# Patient Record
Sex: MALE
[De-identification: names, ages, dates, MRNs, and addresses within clinical notes are randomized per-mention and may not be internally consistent; named-entity substitution may affect disease eponyms.]

---

## 2017-05-07 ENCOUNTER — HOSPITAL ENCOUNTER (EMERGENCY)
Dept: HOSPITAL 57 - BURERS | Age: 72
Discharge: HOME | End: 2017-05-07
Payer: MEDICARE

## 2017-05-07 DIAGNOSIS — E11.9: ICD-10-CM

## 2017-05-07 DIAGNOSIS — M54.2: Primary | ICD-10-CM

## 2017-05-07 DIAGNOSIS — Z79.84: ICD-10-CM

## 2017-05-07 DIAGNOSIS — E78.5: ICD-10-CM

## 2017-05-07 DIAGNOSIS — Z87.891: ICD-10-CM

## 2017-05-07 DIAGNOSIS — Z79.899: ICD-10-CM

## 2017-05-07 DIAGNOSIS — I10: ICD-10-CM

## 2017-05-07 LAB
ALBUMIN SERPL BCG-MCNC: 4.7 G/DL (ref 3.4–4.8)
ALP SERPL-CCNC: 53 U/L (ref 40–150)
ALT SERPL W P-5'-P-CCNC: 13 U/L (ref 8–55)
ANION GAP SERPL CALC-SCNC: 17 MMOL/L (ref 10–20)
AST SERPL-CCNC: 25 U/L (ref 5–34)
BASOPHILS # BLD AUTO: 0.1 THOU/UL (ref 0–0.2)
BASOPHILS NFR BLD AUTO: 1.2 % (ref 0–1)
BILIRUB SERPL-MCNC: 0.4 MG/DL (ref 0.2–1.2)
BUN SERPL-MCNC: 23 MG/DL (ref 8.4–25.7)
CALCIUM SERPL-MCNC: 10.1 MG/DL (ref 7.8–10.44)
CHLORIDE SERPL-SCNC: 100 MMOL/L (ref 98–107)
CO2 SERPL-SCNC: 23 MMOL/L (ref 23–31)
CREAT CL PREDICTED SERPL C-G-VRATE: 0 ML/MIN (ref 70–130)
EOSINOPHIL # BLD AUTO: 0.1 THOU/UL (ref 0–0.7)
EOSINOPHIL NFR BLD AUTO: 1.5 % (ref 0–10)
GLOBULIN SER CALC-MCNC: 3.4 G/DL (ref 2.4–3.5)
GLUCOSE SERPL-MCNC: 97 MG/DL (ref 83–110)
HGB BLD-MCNC: 12.4 G/DL (ref 14–18)
LYMPHOCYTES # BLD AUTO: 1.6 THOU/UL (ref 1.2–3.4)
LYMPHOCYTES NFR BLD AUTO: 21.5 % (ref 21–51)
MCH RBC QN AUTO: 32.5 PG (ref 27–31)
MCV RBC AUTO: 92.6 FL (ref 80–94)
MONOCYTES # BLD AUTO: 0.6 THOU/UL (ref 0.11–0.59)
MONOCYTES NFR BLD AUTO: 8.2 % (ref 0–10)
NEUTROPHILS # BLD AUTO: 4.9 THOU/UL (ref 1.4–6.5)
NEUTROPHILS NFR BLD AUTO: 67.6 % (ref 42–75)
PLATELET # BLD AUTO: 345 THOU/UL (ref 130–400)
POTASSIUM SERPL-SCNC: 4 MMOL/L (ref 3.5–5.1)
RBC # BLD AUTO: 3.8 MILL/UL (ref 4.7–6.1)
SODIUM SERPL-SCNC: 136 MMOL/L (ref 136–145)
WBC # BLD AUTO: 7.3 THOU/UL (ref 4.8–10.8)

## 2017-05-07 PROCEDURE — 85025 COMPLETE CBC W/AUTO DIFF WBC: CPT

## 2017-05-07 PROCEDURE — 80053 COMPREHEN METABOLIC PANEL: CPT

## 2017-05-07 PROCEDURE — 36416 COLLJ CAPILLARY BLOOD SPEC: CPT

## 2017-05-07 PROCEDURE — 84443 ASSAY THYROID STIM HORMONE: CPT

## 2018-06-15 ENCOUNTER — HOSPITAL ENCOUNTER (EMERGENCY)
Dept: HOSPITAL 57 - BURERS | Age: 73
Discharge: HOME | End: 2018-06-15
Payer: MEDICARE

## 2018-06-15 DIAGNOSIS — Z79.899: ICD-10-CM

## 2018-06-15 DIAGNOSIS — Z79.84: ICD-10-CM

## 2018-06-15 DIAGNOSIS — C78.00: Primary | ICD-10-CM

## 2018-06-15 DIAGNOSIS — F41.9: ICD-10-CM

## 2018-06-15 DIAGNOSIS — E78.5: ICD-10-CM

## 2018-06-15 DIAGNOSIS — I10: ICD-10-CM

## 2018-06-15 LAB
ALBUMIN SERPL BCG-MCNC: 3.5 G/DL (ref 3.4–4.8)
ALP SERPL-CCNC: 76 U/L (ref 40–150)
ALT SERPL W P-5'-P-CCNC: 8 U/L (ref 8–55)
ANION GAP SERPL CALC-SCNC: 15 MMOL/L (ref 10–20)
AST SERPL-CCNC: 13 U/L (ref 5–34)
BILIRUB SERPL-MCNC: 0.5 MG/DL (ref 0.2–1.2)
BUN SERPL-MCNC: 11 MG/DL (ref 8.4–25.7)
CALCIUM SERPL-MCNC: 8.8 MG/DL (ref 7.8–10.44)
CHLORIDE SERPL-SCNC: 97 MMOL/L (ref 98–107)
CK MB SERPL-MCNC: 0.6 NG/ML (ref 0–6.6)
CO2 SERPL-SCNC: 21 MMOL/L (ref 23–31)
CREAT CL PREDICTED SERPL C-G-VRATE: 0 ML/MIN (ref 70–130)
GLOBULIN SER CALC-MCNC: 2.9 G/DL (ref 2.4–3.5)
GLUCOSE SERPL-MCNC: 115 MG/DL (ref 83–110)
HGB BLD-MCNC: 9.4 G/DL (ref 14–18)
MCH RBC QN AUTO: 30.2 PG (ref 27–31)
MCV RBC AUTO: 83.2 FL (ref 80–94)
MDIFF COMPLETE?: YES
PLATELET # BLD AUTO: 451 THOU/UL (ref 130–400)
POTASSIUM SERPL-SCNC: 4.1 MMOL/L (ref 3.5–5.1)
RBC # BLD AUTO: 3.1 MILL/UL (ref 4.7–6.1)
SODIUM SERPL-SCNC: 129 MMOL/L (ref 136–145)
TROPONIN I SERPL DL<=0.01 NG/ML-MCNC: (no result) NG/ML (ref ?–0.03)
WBC # BLD AUTO: 12.7 THOU/UL (ref 4.8–10.8)

## 2018-06-15 PROCEDURE — 96374 THER/PROPH/DIAG INJ IV PUSH: CPT

## 2018-06-15 PROCEDURE — 96361 HYDRATE IV INFUSION ADD-ON: CPT

## 2018-06-15 PROCEDURE — 82553 CREATINE MB FRACTION: CPT

## 2018-06-15 PROCEDURE — 85379 FIBRIN DEGRADATION QUANT: CPT

## 2018-06-15 PROCEDURE — 93005 ELECTROCARDIOGRAM TRACING: CPT

## 2018-06-15 PROCEDURE — 84484 ASSAY OF TROPONIN QUANT: CPT

## 2018-06-15 PROCEDURE — 80053 COMPREHEN METABOLIC PANEL: CPT

## 2018-06-15 PROCEDURE — 71045 X-RAY EXAM CHEST 1 VIEW: CPT

## 2018-06-15 PROCEDURE — 85025 COMPLETE CBC W/AUTO DIFF WBC: CPT

## 2018-06-16 NOTE — RAD
PORTABLE CHEST:

 

Date:  06/15/18 

 

HISTORY:  

Pleuritic chest pain. 

 

COMPARISON:  

01/09/16 exam. 

 

FINDINGS:

Heart size is within normal limits. There are atherosclerotic changes of the aorta. Chronic lung melton
ges are seen. Parenchymal density extending from the left hilar region is slightly more prominent mimi
n on the prior exam. I believe this is more related to technique than any real interval change. Patie
nt does have a history of metastatic lung cancer. 

 

IMPRESSION: 

Left parahilar and upper lobe parenchymal changes, slightly more prominent than on the prior exam. I 
believe a large part of this is related to technique. However, if this is the area of patient's lung 
cancer, the possibility that this prominence is related to some type of increasing mass in this regio
n would have to be considered. CT would be suggested on a nonemergent basis for assessment if indicat
ed. 

 

 

POS: [facility]

## 2018-06-19 ENCOUNTER — HOSPITAL ENCOUNTER (EMERGENCY)
Dept: HOSPITAL 57 - BURERS | Age: 73
Discharge: HOME | End: 2018-06-19
Payer: MEDICARE

## 2018-06-19 DIAGNOSIS — E11.9: ICD-10-CM

## 2018-06-19 DIAGNOSIS — I10: ICD-10-CM

## 2018-06-19 DIAGNOSIS — E78.5: ICD-10-CM

## 2018-06-19 DIAGNOSIS — Z87.891: ICD-10-CM

## 2018-06-19 DIAGNOSIS — D64.9: Primary | ICD-10-CM

## 2018-06-19 DIAGNOSIS — C34.90: ICD-10-CM

## 2018-06-19 DIAGNOSIS — Z79.899: ICD-10-CM

## 2018-06-19 DIAGNOSIS — Z79.84: ICD-10-CM

## 2018-06-19 LAB
ACTUAL BICARBONATE (HCO3V): 22 MEQ/L (ref 22–28)
ALBUMIN SERPL BCG-MCNC: 3.5 G/DL (ref 3.4–4.8)
ALP SERPL-CCNC: 93 U/L (ref 40–150)
ALT SERPL W P-5'-P-CCNC: 10 U/L (ref 8–55)
ANION GAP SERPL CALC-SCNC: 13 MMOL/L (ref 10–20)
AST SERPL-CCNC: 21 U/L (ref 5–34)
BACTERIA UR QL AUTO: (no result) HPF
BASE EXCESS BLDA CALC-SCNC: -1.5 MEQ/L
BASOPHILS # BLD AUTO: 0.2 THOU/UL (ref 0–0.2)
BASOPHILS NFR BLD AUTO: 2 % (ref 0–1)
BILIRUB SERPL-MCNC: 0.5 MG/DL (ref 0.2–1.2)
BUN SERPL-MCNC: 9 MG/DL (ref 8.4–25.7)
CALCIUM SERPL-MCNC: 8.9 MG/DL (ref 7.8–10.44)
CHLORIDE SERPL-SCNC: 96 MMOL/L (ref 98–107)
CK MB SERPL-MCNC: 0.7 NG/ML (ref 0–6.6)
CO2 SERPL-SCNC: 25 MMOL/L (ref 23–31)
CREAT CL PREDICTED SERPL C-G-VRATE: 0 ML/MIN (ref 70–130)
EOSINOPHIL # BLD AUTO: 0.8 THOU/UL (ref 0–0.7)
EOSINOPHIL NFR BLD AUTO: 7.7 % (ref 0–10)
GLOBULIN SER CALC-MCNC: 3.1 G/DL (ref 2.4–3.5)
GLUCOSE SERPL-MCNC: 166 MG/DL (ref 83–110)
HEMOGLOBIN (HB): 11.2 G/DL (ref 12.6–17.4)
HGB BLD-MCNC: 10.1 G/DL (ref 14–18)
LYMPHOCYTES # BLD AUTO: 0.5 THOU/UL (ref 1.2–3.4)
LYMPHOCYTES NFR BLD AUTO: 5.5 % (ref 21–51)
MCH RBC QN AUTO: 29.8 PG (ref 27–31)
MCV RBC AUTO: 82.6 FL (ref 80–94)
MONOCYTES # BLD AUTO: 0.8 THOU/UL (ref 0.11–0.59)
MONOCYTES NFR BLD AUTO: 7.9 % (ref 0–10)
NEUTROPHILS # BLD AUTO: 7.6 THOU/UL (ref 1.4–6.5)
NEUTROPHILS NFR BLD AUTO: 77 % (ref 42–75)
PLATELET # BLD AUTO: 512 THOU/UL (ref 130–400)
POTASSIUM SERPL-SCNC: 3.9 MMOL/L (ref 3.5–5.1)
PROT UR STRIP.AUTO-MCNC: 100 MG/DL
RBC # BLD AUTO: 3.39 MILL/UL (ref 4.7–6.1)
RBC UR QL AUTO: (no result) HPF (ref 0–3)
SODIUM SERPL-SCNC: 130 MMOL/L (ref 136–145)
SP GR UR STRIP: 1.02 (ref 1–1.03)
TROPONIN I SERPL DL<=0.01 NG/ML-MCNC: 0.02 NG/ML (ref ?–0.03)
TROPONIN I SERPL DL<=0.01 NG/ML-MCNC: 0.02 NG/ML (ref ?–0.03)
UNIDENT CRYS #/AREA URNS HPF: (no result) HPF
URNS CMNT MICRO: (no result)
WBC # BLD AUTO: 9.9 THOU/UL (ref 4.8–10.8)
WBC UR QL AUTO: (no result) HPF (ref 0–3)

## 2018-06-19 PROCEDURE — 81015 MICROSCOPIC EXAM OF URINE: CPT

## 2018-06-19 PROCEDURE — 82553 CREATINE MB FRACTION: CPT

## 2018-06-19 PROCEDURE — 71275 CT ANGIOGRAPHY CHEST: CPT

## 2018-06-19 PROCEDURE — 93005 ELECTROCARDIOGRAM TRACING: CPT

## 2018-06-19 PROCEDURE — 81003 URINALYSIS AUTO W/O SCOPE: CPT

## 2018-06-19 PROCEDURE — 85025 COMPLETE CBC W/AUTO DIFF WBC: CPT

## 2018-06-19 PROCEDURE — 71046 X-RAY EXAM CHEST 2 VIEWS: CPT

## 2018-06-19 PROCEDURE — 96376 TX/PRO/DX INJ SAME DRUG ADON: CPT

## 2018-06-19 PROCEDURE — 83880 ASSAY OF NATRIURETIC PEPTIDE: CPT

## 2018-06-19 PROCEDURE — 96361 HYDRATE IV INFUSION ADD-ON: CPT

## 2018-06-19 PROCEDURE — A4216 STERILE WATER/SALINE, 10 ML: HCPCS

## 2018-06-19 PROCEDURE — 36415 COLL VENOUS BLD VENIPUNCTURE: CPT

## 2018-06-19 PROCEDURE — 84484 ASSAY OF TROPONIN QUANT: CPT

## 2018-06-19 PROCEDURE — 80053 COMPREHEN METABOLIC PANEL: CPT

## 2018-06-19 PROCEDURE — 85379 FIBRIN DEGRADATION QUANT: CPT

## 2018-06-19 PROCEDURE — 96374 THER/PROPH/DIAG INJ IV PUSH: CPT

## 2018-06-19 PROCEDURE — 82805 BLOOD GASES W/O2 SATURATION: CPT

## 2018-06-19 NOTE — CT
CT ANGIO OF THE CHEST WITH CONTRAST

6/19/18

 

Spiral CT of the chest was performed for evaluation of difficulty breathing and an elevated D-dimer. 
There are no recent scans to compare with. Axial slices were acquired after a bolus of IV contrast. C
oronal, sagittal and oblique reconstructions through the pulmonary vessels were subsequently obtained
. 

 

There is good opacification of the pulmonary arteries. There are no filling defects to strongly sugge
st a pulmonary emboli. The aorta shows calcification, as do the coronary arteries. There was no sign 
of aneurysm or dissection. 

 

There is a large cavitary lesion in the left upper lobe of the lung. The cavity itself was 3.5 cm acr
oss and the larger soft tissue area around it measures about 5.9 cm in length. The margins are irregu
lar and spiculated making neoplasm probable. There are a few small nodular changes in the apex of the
 left upper lobe as well as areas of apical pleural thickening bilaterally. Additionally, left hilar 
adenopathy is present. There also appears to be extensive adenopathy and/or mass in the posterior med
iastinum. The patient's esophagus seems to be somewhat compressed by some of these nodes leading to s
ome dilation and fluid filled state of the esophagus above that point. There may be a relative degree
 of obstruction here. Bilateral pleural effusions are present. Scans down in the upper abdomen sugges
ts at least a 1.5 cm mass associated with the left adrenal gland. The right adrenal gland is rather n
odular in character. Not all of the liver was shown, but the visible portions showed no discrete lesi
ons. I would note that mixed sclerotic and cystic changes are seen in the patient's thoracic vertebra
e which are suggestive of metastatic disease. 

 

IMPRESSION:  

1.      Large irregular cavitary lesion of the left upper lobe. Ancillary nodular changes above that.
 

 

2.      No evidence of pulmonary embolism.

 

3.      Mediastinal adenopathy and mass which is in the mid thoracic region around the level of the c
[place]. This appears to partially compress the patient's esophagus with some retained fluid above it. 


 

4.      Small bilateral pleural effusions. 

 

5.      Small left adrenal mass. Possible right adrenal mass. 

 

6.      Mixed sclerotic and lytic areas of the vertebrae suggestive of metastatic disease. 

 

Findings discussed with Dr. Milton at 0813 on 6/19/18.

 

POS: HOME

## 2018-06-19 NOTE — RAD
CHEST 2 VIEWS:

 

DATE: 6/19/18.

 

FINDINGS: 

Comparison is made with the 6/15 study.  A MediPort-like catheter has been placed on the right side. 
 The tip of the tube is probably near the end of the superior vena cava.  I do not see any sign of pn
eumothorax or pleural effusion.  The right lung is relatively clear.  There is density radiating from
 the left hilum as before.  No effusions are seen on the left.  The heart size is normal and there is
 calcification in the aorta.

 

IMPRESSION: 

Status post port placement with no acute changes in the lungs.

 

POS: HOME